# Patient Record
Sex: FEMALE | Race: WHITE | NOT HISPANIC OR LATINO | Employment: OTHER | URBAN - METROPOLITAN AREA
[De-identification: names, ages, dates, MRNs, and addresses within clinical notes are randomized per-mention and may not be internally consistent; named-entity substitution may affect disease eponyms.]

---

## 2019-10-21 PROBLEM — 398050005 DIVERTICULAR DISEASE OF COLON: Status: ACTIVE | Noted: 2019-10-21

## 2021-03-01 ENCOUNTER — OFFICE VISIT (OUTPATIENT)
Dept: URBAN - METROPOLITAN AREA CLINIC 33 | Facility: CLINIC | Age: 66
End: 2021-03-01

## 2021-03-01 VITALS
BODY MASS INDEX: 30.32 KG/M2 | TEMPERATURE: 96.3 F | SYSTOLIC BLOOD PRESSURE: 130 MMHG | DIASTOLIC BLOOD PRESSURE: 88 MMHG | HEART RATE: 96 BPM | HEIGHT: 65 IN | OXYGEN SATURATION: 90 % | WEIGHT: 182 LBS

## 2021-03-01 PROBLEM — 266433003 GASTRO-ESOPHAGEAL REFLUX DISEASE WITH ESOPHAGITIS: Status: ACTIVE | Noted: 2019-10-21

## 2021-03-01 RX ORDER — METRONIDAZOLE 500 MG/1
TAKE 1 TABLET BY MOUTH TWICE A DAY FOR 7 DAYS TABLET ORAL
Qty: 14 UNSPECIFIED | Refills: 0 | Status: ON HOLD | COMMUNITY

## 2021-03-01 RX ORDER — OMEPRAZOLE 20 MG/1
TAKE 1 CAPSULE BY MOUTH EVERY DAY CAPSULE, DELAYED RELEASE ORAL
OUTPATIENT

## 2021-03-01 RX ORDER — DILTIAZEM HYDROCHLORIDE 120 MG/1
TAKE 1 CAPSULE (120 MG TOTAL) BY MOUTH DAILY CAPSULE, EXTENDED RELEASE ORAL
Qty: 30 UNSPECIFIED | Refills: 5 | Status: DISCONTINUED | COMMUNITY

## 2021-03-01 RX ORDER — PRAVASTATIN SODIUM 40 MG/1
TAKE 1 TABLET BY MOUTH EVERY DAY AT NIGHT TABLET ORAL
Qty: 30 UNSPECIFIED | Refills: 1 | Status: ACTIVE | COMMUNITY

## 2021-03-01 RX ORDER — PREDNISONE 10 MG/1
TAKE 1 TABLET BY MOUTH EVERY DAY TABLET ORAL
Qty: 90 UNSPECIFIED | Refills: 0 | Status: ACTIVE | COMMUNITY

## 2021-03-01 RX ORDER — LEVOFLOXACIN 750 MG/1
TAKE 1 TABLET BY MOUTH EVERY DAY FOR 7 DAYS TABLET, FILM COATED ORAL
Qty: 7 UNSPECIFIED | Refills: 0 | Status: ON HOLD | COMMUNITY

## 2021-03-01 RX ORDER — FLUTICASONE FUROATE, UMECLIDINIUM BROMIDE AND VILANTEROL TRIFENATATE 100; 62.5; 25 UG/1; UG/1; UG/1
INHALE 1 PUFF EVERY DAY POWDER RESPIRATORY (INHALATION)
Qty: 60 UNSPECIFIED | Refills: 11 | Status: ACTIVE | COMMUNITY

## 2021-03-01 RX ORDER — INSULIN GLARGINE 100 [IU]/ML
INJECTED 20 UNITS EVERY MORNING INJECTION, SOLUTION SUBCUTANEOUS
Qty: 6 UNSPECIFIED | Refills: 5 | Status: DISCONTINUED | COMMUNITY

## 2021-03-01 RX ORDER — SITAGLIPTIN 50 MG/1
TAKE 1 TABLET BY MOUTH EVERY DAY TABLET, FILM COATED ORAL
Qty: 30 UNSPECIFIED | Refills: 3 | Status: DISCONTINUED | COMMUNITY

## 2021-03-01 RX ORDER — SERTRALINE HYDROCHLORIDE 50 MG/1
1 TABLET TABLET, FILM COATED ORAL ONCE A DAY
Qty: 30 | Status: ACTIVE | COMMUNITY

## 2021-03-01 RX ORDER — MONTELUKAST SODIUM 10 MG/1
TAKE 1 TABLET DAILY TABLET ORAL
Qty: 30 UNSPECIFIED | Refills: 11 | Status: ACTIVE | COMMUNITY

## 2021-03-01 RX ORDER — METOPROLOL TARTRATE 25 MG/1
TAKE 1 TABLET BY MOUTH TWICE A DAY TABLET ORAL
Qty: 60 UNSPECIFIED | Refills: 5 | Status: ACTIVE | COMMUNITY

## 2021-03-01 RX ORDER — OMEPRAZOLE 20 MG/1
TAKE 1 CAPSULE BY MOUTH EVERY DAY CAPSULE, DELAYED RELEASE ORAL
Status: ON HOLD | COMMUNITY

## 2021-03-01 NOTE — HPI-MIGRATED HPI
;   ;   ;   ;     Diverticulitis : 65-Year-old female patient presents today to follow up from an abnormal PET scan (02/15/2021) ordered by Dr. Tommy Restrepo with findings of diverticuilits.  Results are noted below.  Stable post therapy changes in the right perihilar region without evidence of recurrent or metastatic disease to the neck, chest, abdomen or pelvis.  Interim development of inflammatory changes and hyper metabolic activity associated with multiple diverticula in the sigmoid colon. Sigmoid diverticulitis is suspected.  2 to 3 mm non obstructing calculus in the right kidney. Centrilobular pulmonary emphysema.  On (2/15/2021), Dr. Restrepo prescribed patient Flagyl 500 MG BID Levaquin 750 MG 1 QD X 7 days, which she completed on (2/25/2021). She denies further episodes of abdominal pain over the past week.  Patient reports experiencing Lower abdominal pain with onset Dec. 2020, at which time she was treated with Macrobid BID x 7days for UTI. She report some improvement of pain during treatment of UTI.    Described as a dull ache that would randomly become sharp in nature.  She admits relief of symptoms.  Sympoms would occur in an intermittent pattern throughout the day, lasting for second to minutes at a time.   She report consuming Cheerio's daily prior to onset of symptoms.  Most recent labs were performed 02/19/2021 with abnormal findings of: Neutrophil: 75.7 (High), Lymphocyte: 17.0 (Low), otherwise normal.    Last visit (10/21/2019) Patient presents today with her Son and daughter after her Colonoscopy and EGD and follow up abnormal PET Scan. She denies complications following her procedure.   Admit normal and formed bowel movements per day. Denies melena, blood or mucus in stools.  Last visit (10/03/2019) Patient presents today at the request of Dr. 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  Consider colonoscopy to further evaluate, if not since prior F-18 FDG PET/CT scan.  Dr. Restrepo plan from (7/31/2019) note patient refused a colonoscopy at that time ;   Early Satiety : Admits improvement of early satiety since her last office visit.   Last visit (10/21/2019) Continue to admit early satiety.  Admit some improvement with consuming small  meals.    Last visit (10/03/2019) Admit early satiety with onset a few months ago.  Described as a feeling full quick when eating.  Admit improvement with consuming smaller portion meals throughout the day.;   Change in bowel habits : Admit change in bowel habits with onset a month ago.  Described as multiple loose and watery bowel movements daily with out melena, blood or mucus in stools.  She attributes symptoms to the use of antibiotics (Dec. 2020, Macrobid BID x 7 days for UTI, also treated by Pulmonologist with a Zpack 1 QOD x 2 months just before Dec. 2020).  She admit improve in bowel habits over the past 4-5 days.  Currently admit 2 semi-formed bowel movements oer day [id number] strain.;   Heartburn : Denies any episodes of heartburn since her last office visit.  She discontinued the use of Omeprazole 20 mg  about 2 weeks ago due to one of her antibiotics said to avoid antacid when taking it.    Last visit (10/21/2019) Admit control of symptoms with the use of Omeprazole 20 MG 1 QD.   Last visit (10/03/2019) She admit approximately an 8 year history of heartburn.  She has been taking Omeprazole 40 mg 1 QD over the past 8+ years with  complete control of symptoms.   Symptoms are described as a burning sensation retrosternally and belching.   Denies having an EGD or Barium Swallow before.  Denies a family history of esophageal/gastric cancer or disease.;

## 2021-03-04 ENCOUNTER — LAB OUTSIDE AN ENCOUNTER (OUTPATIENT)
Dept: URBAN - METROPOLITAN AREA CLINIC 35 | Facility: CLINIC | Age: 66
End: 2021-03-04

## 2021-03-09 LAB
GASTROINTESTINAL PATHOGEN: (no result)
PANCREATICELASTASE ELISA, STOOL: (no result)

## 2021-03-24 ENCOUNTER — TELEPHONE ENCOUNTER (OUTPATIENT)
Dept: URBAN - METROPOLITAN AREA CLINIC 35 | Facility: CLINIC | Age: 66
End: 2021-03-24

## 2021-03-30 LAB — CLOSTRIDIUM DIFFICILE: (no result)

## 2021-03-31 ENCOUNTER — TELEPHONE ENCOUNTER (OUTPATIENT)
Dept: URBAN - METROPOLITAN AREA CLINIC 35 | Facility: CLINIC | Age: 66
End: 2021-03-31

## 2021-04-01 ENCOUNTER — TELEPHONE ENCOUNTER (OUTPATIENT)
Dept: URBAN - METROPOLITAN AREA CLINIC 35 | Facility: CLINIC | Age: 66
End: 2021-04-01

## 2021-04-05 LAB
CULTURE, STOOL, SAL/SHIG/CAMPY AND SHIGA TOXINS EIA W/RFL E.COLI O157 CULT: (no result)

## 2021-06-16 ENCOUNTER — OFFICE VISIT (OUTPATIENT)
Dept: URBAN - METROPOLITAN AREA CLINIC 35 | Facility: CLINIC | Age: 66
End: 2021-06-16

## 2021-06-16 ENCOUNTER — TELEPHONE ENCOUNTER (OUTPATIENT)
Dept: URBAN - METROPOLITAN AREA CLINIC 35 | Facility: CLINIC | Age: 66
End: 2021-06-16

## 2021-06-16 VITALS — WEIGHT: 183 LBS | BODY MASS INDEX: 30.49 KG/M2 | HEIGHT: 65 IN

## 2021-06-16 RX ORDER — LEVOFLOXACIN 750 MG
1 TABLET TABLET ORAL ONCE A DAY
Qty: 10 | Refills: 0 | OUTPATIENT
Start: 2021-06-16

## 2021-06-16 RX ORDER — SERTRALINE HYDROCHLORIDE 50 MG/1
1 TABLET TABLET, FILM COATED ORAL ONCE A DAY
Qty: 30 | Status: ON HOLD | COMMUNITY

## 2021-06-16 RX ORDER — PRAVASTATIN SODIUM 40 MG/1
TAKE 1 TABLET BY MOUTH EVERY DAY AT NIGHT TABLET ORAL
Qty: 30 UNSPECIFIED | Refills: 1 | Status: ACTIVE | COMMUNITY

## 2021-06-16 RX ORDER — FLUTICASONE FUROATE, UMECLIDINIUM BROMIDE AND VILANTEROL TRIFENATATE 100; 62.5; 25 UG/1; UG/1; UG/1
INHALE 1 PUFF EVERY DAY POWDER RESPIRATORY (INHALATION)
Qty: 60 UNSPECIFIED | Refills: 11 | Status: ON HOLD | COMMUNITY

## 2021-06-16 RX ORDER — PREDNISONE 10 MG/1
TAKE 1 TABLET BY MOUTH EVERY DAY TABLET ORAL
Qty: 90 UNSPECIFIED | Refills: 0 | Status: ACTIVE | COMMUNITY

## 2021-06-16 RX ORDER — METOPROLOL TARTRATE 25 MG/1
TAKE 1 TABLET BY MOUTH TWICE A DAY TABLET ORAL
Qty: 60 UNSPECIFIED | Refills: 5 | Status: ACTIVE | COMMUNITY

## 2021-06-16 RX ORDER — METRONIDAZOLE 500 MG/1
1 TABLET TABLET, FILM COATED ORAL THREE TIMES A DAY
Qty: 30 | Refills: 0 | OUTPATIENT
Start: 2021-06-16

## 2021-06-16 RX ORDER — LEVOFLOXACIN 750 MG/1
TAKE 1 TABLET BY MOUTH EVERY DAY FOR 7 DAYS TABLET, FILM COATED ORAL
Qty: 7 UNSPECIFIED | Refills: 0 | Status: ON HOLD | COMMUNITY

## 2021-06-16 RX ORDER — METRONIDAZOLE 500 MG/1
TAKE 1 TABLET BY MOUTH TWICE A DAY FOR 7 DAYS TABLET ORAL
Qty: 14 UNSPECIFIED | Refills: 0 | Status: ON HOLD | COMMUNITY

## 2021-06-16 RX ORDER — MONTELUKAST SODIUM 10 MG/1
TAKE 1 TABLET DAILY TABLET ORAL
Qty: 30 UNSPECIFIED | Refills: 11 | Status: ACTIVE | COMMUNITY

## 2021-06-16 RX ORDER — OMEPRAZOLE 20 MG/1
TAKE 1 CAPSULE BY MOUTH EVERY DAY CAPSULE, DELAYED RELEASE ORAL
Status: ACTIVE | COMMUNITY

## 2021-06-16 NOTE — HPI-MIGRATED HPI
;   ;   ;     Diverticulitis : Patient presents today for follow up of diverticulitis.  She states last night she started experiencing lower abdominal pain all the way across.  She describes the pain as sharp and states it also feels like cramping.  She states she also feels a little bloated.  She denies any constipation.  States she is having regular bowel movements.  Patient admits to abd pain intermittently, and denies any aggravating factors or palliative factors.   Patient states she was diagnosed with diverticulitis back in February as well as December of last year.     Last visit (03/01/2021) 65-Year-old female patient presents today to follow up from an abnormal PET scan (02/15/2021) ordered by Dr. Tommy Restrepo with findings of diverticuilits.  Results are noted below.  Stable post therapy changes in the right perihilar region without evidence of recurrent or metastatic disease to the neck, chest, abdomen or pelvis.  Interim development of inflammatory changes and hyper metabolic activity associated with multiple diverticula in the sigmoid colon. Sigmoid diverticulitis is suspected.  2 to 3 mm non obstructing calculus in the right kidney. Centrilobular pulmonary emphysema.  On (2/15/2021), Dr. Restrepo prescribed patient Flagyl 500 MG BID Levaquin 750 MG 1 QD X 7 days, which she completed on (2/25/2021). She denies further episodes of abdominal pain over the past week.  Patient reports experiencing Lower abdominal pain with onset Dec. 2020, at which time she was treated with Macrobid BID x 7days for UTI. She report some improvement of pain during treatment of UTI.    Described as a dull ache that would randomly become sharp in nature.  She admits relief of symptoms.  Sympoms would occur in an intermittent pattern throughout the day, lasting for second to minutes at a time.   She report consuming Cheerio's daily prior to onset of symptoms.  Most recent labs were performed 02/19/2021 with abnormal findings of: Neutrophil: 75.7 (High), Lymphocyte: 17.0 (Low), otherwise normal.    Last visit (10/21/2019) Patient presents today with her Son and daughter after her Colonoscopy and EGD and follow up abnormal PET Scan. She denies complications following her procedure.   Admit normal and formed bowel movements per day. Denies melena, blood or mucus in stools.  Last visit (10/03/2019) Patient presents today at the request of Dr. 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  Consider colonoscopy to further evaluate, if not since prior F-18 FDG PET/CT scan.  Dr. Restrepo plan from (7/31/2019) note patient refused a colonoscopy at that time;   Early Satiety : Patient admits to experiencing some early satiety.  Last visit (03/01/2021) Admits improvement of early satiety since her last office visit.   Last visit (10/21/2019) Continue to admit early satiety.  Admit some improvement with consuming small  meals.    Last visit (10/03/2019) Admit early satiety with onset a few months ago.  Described as a feeling full quick when eating.  Admit improvement with consuming smaller portion meals throughout the day.;   Heartburn : She denies any episodes of heartburn since her last visit.  Last visit (03/01/2021) Denies any episodes of heartburn since her last office visit.  She discontinued the use of Omeprazole 20 mg  about 2 weeks ago due to one of her antibiotics said to avoid antacid when taking it.    Last visit (10/21/2019) Admit control of symptoms with the use of Omeprazole 20 MG 1 QD.   Last visit (10/03/2019) She admit approximately an 8 year history of heartburn.  She has been taking Omeprazole 40 mg 1 QD over the past 8+ years with  complete control of symptoms.   Symptoms are described as a burning sensation retrosternally and belching.   Denies having an EGD or Barium Swallow before.  Denies a family history of esophageal/gastric cancer or disease.;

## 2021-06-29 ENCOUNTER — OFFICE VISIT (OUTPATIENT)
Dept: URBAN - METROPOLITAN AREA CLINIC 35 | Facility: CLINIC | Age: 66
End: 2021-06-29

## 2021-06-29 VITALS — HEIGHT: 65 IN

## 2021-06-29 RX ORDER — PRAVASTATIN SODIUM 40 MG/1
TAKE 1 TABLET BY MOUTH EVERY DAY AT NIGHT TABLET ORAL
Qty: 30 UNSPECIFIED | Refills: 1 | Status: ACTIVE | COMMUNITY

## 2021-06-29 RX ORDER — METRONIDAZOLE 500 MG/1
TAKE 1 TABLET BY MOUTH TWICE A DAY FOR 7 DAYS TABLET ORAL
Qty: 14 UNSPECIFIED | Refills: 0 | Status: ON HOLD | COMMUNITY

## 2021-06-29 RX ORDER — SERTRALINE HYDROCHLORIDE 50 MG/1
1 TABLET TABLET, FILM COATED ORAL ONCE A DAY
Qty: 30 | Status: ON HOLD | COMMUNITY

## 2021-06-29 RX ORDER — LEVOFLOXACIN 750 MG/1
TAKE 1 TABLET BY MOUTH EVERY DAY FOR 7 DAYS TABLET, FILM COATED ORAL
Qty: 7 UNSPECIFIED | Refills: 0 | Status: ON HOLD | COMMUNITY

## 2021-06-29 RX ORDER — PREDNISONE 10 MG/1
TAKE 1 TABLET BY MOUTH EVERY DAY TABLET ORAL
Qty: 90 UNSPECIFIED | Refills: 0 | Status: ACTIVE | COMMUNITY

## 2021-06-29 RX ORDER — OMEPRAZOLE 20 MG/1
TAKE 1 CAPSULE BY MOUTH EVERY DAY CAPSULE, DELAYED RELEASE ORAL
Status: ACTIVE | COMMUNITY

## 2021-06-29 RX ORDER — LEVOFLOXACIN 750 MG
1 TABLET TABLET ORAL ONCE A DAY
Qty: 10 | Refills: 0 | Status: ACTIVE | COMMUNITY
Start: 2021-06-16

## 2021-06-29 RX ORDER — METOPROLOL TARTRATE 25 MG/1
TAKE 1 TABLET BY MOUTH TWICE A DAY TABLET ORAL
Qty: 60 UNSPECIFIED | Refills: 5 | Status: ACTIVE | COMMUNITY

## 2021-06-29 RX ORDER — MONTELUKAST SODIUM 10 MG/1
TAKE 1 TABLET DAILY TABLET ORAL
Qty: 30 UNSPECIFIED | Refills: 11 | Status: ACTIVE | COMMUNITY

## 2021-06-29 RX ORDER — METRONIDAZOLE 500 MG/1
1 TABLET TABLET, FILM COATED ORAL THREE TIMES A DAY
Qty: 30 | Refills: 0 | Status: ACTIVE | COMMUNITY
Start: 2021-06-16

## 2021-06-29 RX ORDER — FLUTICASONE FUROATE, UMECLIDINIUM BROMIDE AND VILANTEROL TRIFENATATE 100; 62.5; 25 UG/1; UG/1; UG/1
INHALE 1 PUFF EVERY DAY POWDER RESPIRATORY (INHALATION)
Qty: 60 UNSPECIFIED | Refills: 11 | Status: ON HOLD | COMMUNITY

## 2021-06-29 NOTE — HPI-MIGRATED HPI
;   ;   ;     Diverticulitis : Patient presents today for a follow up on diverticulitis.  She has some residual lower abd soreness, but overall feels much better.  She has semi-solid stools now, no fever/chills.  She finished her antibiotics 4 days ago.  Last visit (06/16/2021) Patient presents today for follow up of diverticulitis.  She states last night she started experiencing lower abdominal pain all the way across.  She describes the pain as sharp and states it also feels like cramping.  She states she also feels a little bloated.  She denies any constipation.  States she is having regular bowel movements.  Patient admits to abd pain intermittently, and denies any aggravating factors or palliative factors.   Patient states she was diagnosed with diverticulitis back in February as well as December of last year.     Last visit (03/01/2021) 65-Year-old female patient presents today to follow up from an abnormal PET scan (02/15/2021) ordered by Dr. Tommy Restrepo with findings of diverticuilits.  Results are noted below.  Stable post therapy changes in the right perihilar region without evidence of recurrent or metastatic disease to the neck, chest, abdomen or pelvis.  Interim development of inflammatory changes and hyper metabolic activity associated with multiple diverticula in the sigmoid colon. Sigmoid diverticulitis is suspected.  2 to 3 mm non obstructing calculus in the right kidney. Centrilobular pulmonary emphysema.  On (2/15/2021), Dr. Restrepo prescribed patient Flagyl 500 MG BID Levaquin 750 MG 1 QD X 7 days, which she completed on (2/25/2021). She denies further episodes of abdominal pain over the past week.  Patient reports experiencing Lower abdominal pain with onset Dec. 2020, at which time she was treated with Macrobid BID x 7days for UTI. She report some improvement of pain during treatment of UTI.    Described as a dull ache that would randomly become sharp in nature.  She admits relief of symptoms.  Sympoms would occur in an intermittent pattern throughout the day, lasting for second to minutes at a time.   She report consuming Cheerio's daily prior to onset of symptoms.  Most recent labs were performed 02/19/2021 with abnormal findings of: Neutrophil: 75.7 (High), Lymphocyte: 17.0 (Low), otherwise normal.    Last visit (10/21/2019) Patient presents today with her Son and daughter after her Colonoscopy and EGD and follow up abnormal PET Scan. She denies complications following her procedure.   Admit normal and formed bowel movements per day. Denies melena, blood or mucus in stools.  Last visit (10/03/2019) Patient presents today at the request of Dr. 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  Consider colonoscopy to further evaluate, if not since prior F-18 FDG PET/CT scan.  Dr. Restrepo plan from (7/31/2019) note patient refused a colonoscopy at that time;   Early Satiety : Patient admits to experiencing some early satiety. She feels less bloated and her symptom feels better, also taking her probiotics are helping.  Last visit (03/01/2021) Admits improvement of early satiety since her last office visit.   Last visit (10/21/2019) Continue to admit early satiety.  Admit some improvement with consuming small  meals.    Last visit (10/03/2019) Admit early satiety with onset a few months ago.  Described as a feeling full quick when eating.  Admit improvement with consuming smaller portion meals throughout the day.;   Heartburn : She denies any episodes of heartburn since her last visit.  Last visit (03/01/2021) Denies any episodes of heartburn since her last office visit.  She discontinued the use of Omeprazole 20 mg  about 2 weeks ago due to one of her antibiotics said to avoid antacid when taking it.    Last visit (10/21/2019) Admit control of symptoms with the use of Omeprazole 20 MG 1 QD.   Last visit (10/03/2019) She admit approximately an 8 year history of heartburn.  She has been taking Omeprazole 40 mg 1 QD over the past 8+ years with  complete control of symptoms.   Symptoms are described as a burning sensation retrosternally and belching.   Denies having an EGD or Barium Swallow before.  Denies a family history of esophageal/gastric cancer or disease.;

## 2021-07-27 ENCOUNTER — TELEPHONE ENCOUNTER (OUTPATIENT)
Dept: URBAN - METROPOLITAN AREA CLINIC 35 | Facility: CLINIC | Age: 66
End: 2021-07-27

## 2021-07-27 RX ORDER — AMOXICILLIN AND CLAVULANATE POTASSIUM 875; 125 MG/1; MG/1
1 TABLET TABLET, FILM COATED ORAL
Qty: 20 | Refills: 0 | OUTPATIENT
Start: 2021-07-27

## 2021-07-27 RX ORDER — HYOSCYAMINE SULFATE 0.12 MG/1
1 TABLET AS NEEDED ABDOMINAL PAIN/CRAMPING TABLET ORAL
Qty: 28 TABLET | Refills: 0 | OUTPATIENT
Start: 2021-07-27

## 2021-07-29 ENCOUNTER — DASHBOARD ENCOUNTERS (OUTPATIENT)
Age: 66
End: 2021-07-29

## 2021-07-29 ENCOUNTER — OFFICE VISIT (OUTPATIENT)
Dept: URBAN - METROPOLITAN AREA CLINIC 33 | Facility: CLINIC | Age: 66
End: 2021-07-29

## 2021-07-29 VITALS
BODY MASS INDEX: 30.32 KG/M2 | HEART RATE: 127 BPM | DIASTOLIC BLOOD PRESSURE: 82 MMHG | SYSTOLIC BLOOD PRESSURE: 138 MMHG | WEIGHT: 182 LBS | OXYGEN SATURATION: 94 % | HEIGHT: 65 IN

## 2021-07-29 RX ORDER — LEVOFLOXACIN 750 MG
1 TABLET TABLET ORAL ONCE A DAY
Qty: 10 | Refills: 0 | Status: DISCONTINUED | COMMUNITY
Start: 2021-06-16

## 2021-07-29 RX ORDER — HYOSCYAMINE SULFATE 0.12 MG/1
1 TABLET AS NEEDED ABDOMINAL PAIN/CRAMPING TABLET ORAL
Qty: 28 TABLET | Refills: 0 | Status: ACTIVE | COMMUNITY
Start: 2021-07-27

## 2021-07-29 RX ORDER — LEVOFLOXACIN 750 MG/1
TAKE 1 TABLET BY MOUTH EVERY DAY FOR 7 DAYS TABLET, FILM COATED ORAL
Qty: 7 UNSPECIFIED | Refills: 0 | Status: ON HOLD | COMMUNITY

## 2021-07-29 RX ORDER — OMEPRAZOLE 20 MG/1
TAKE 1 CAPSULE BY MOUTH EVERY DAY CAPSULE, DELAYED RELEASE ORAL
Status: ACTIVE | COMMUNITY

## 2021-07-29 RX ORDER — METRONIDAZOLE 500 MG/1
1 TABLET TABLET, FILM COATED ORAL THREE TIMES A DAY
Qty: 30 | Refills: 0 | Status: DISCONTINUED | COMMUNITY
Start: 2021-06-16

## 2021-07-29 RX ORDER — SERTRALINE HYDROCHLORIDE 50 MG/1
1 TABLET TABLET, FILM COATED ORAL ONCE A DAY
Qty: 30 | Status: ON HOLD | COMMUNITY

## 2021-07-29 RX ORDER — PRAVASTATIN SODIUM 40 MG/1
TAKE 1 TABLET BY MOUTH EVERY DAY AT NIGHT TABLET ORAL
Qty: 30 UNSPECIFIED | Refills: 1 | Status: ACTIVE | COMMUNITY

## 2021-07-29 RX ORDER — PREDNISONE 10 MG/1
TAKE 1 TABLET BY MOUTH EVERY DAY TABLET ORAL
Qty: 90 UNSPECIFIED | Refills: 0 | Status: ACTIVE | COMMUNITY

## 2021-07-29 RX ORDER — AMOXICILLIN AND CLAVULANATE POTASSIUM 875; 125 MG/1; MG/1
1 TABLET TABLET, FILM COATED ORAL
Qty: 20 | Refills: 0 | Status: ACTIVE | COMMUNITY
Start: 2021-07-27

## 2021-07-29 RX ORDER — FLUTICASONE FUROATE, UMECLIDINIUM BROMIDE AND VILANTEROL TRIFENATATE 100; 62.5; 25 UG/1; UG/1; UG/1
INHALE 1 PUFF EVERY DAY POWDER RESPIRATORY (INHALATION)
Qty: 60 UNSPECIFIED | Refills: 11 | Status: ON HOLD | COMMUNITY

## 2021-07-29 RX ORDER — METOPROLOL TARTRATE 25 MG/1
TAKE 1 TABLET BY MOUTH TWICE A DAY TABLET ORAL
Qty: 60 UNSPECIFIED | Refills: 5 | Status: ACTIVE | COMMUNITY

## 2021-07-29 RX ORDER — AMOXICILLIN AND CLAVULANATE POTASSIUM 875; 125 MG/1; MG/1
1 TABLET TABLET, FILM COATED ORAL
OUTPATIENT
Start: 2021-07-27

## 2021-07-29 RX ORDER — MONTELUKAST SODIUM 10 MG/1
TAKE 1 TABLET DAILY TABLET ORAL
Qty: 30 UNSPECIFIED | Refills: 11 | Status: ACTIVE | COMMUNITY

## 2021-07-29 RX ORDER — DOCUSATE SODIUM 100 MG/1
1 CAPSULE AS NEEDED CAPSULE ORAL TWICE A DAY
Status: ACTIVE | COMMUNITY

## 2021-07-29 RX ORDER — METRONIDAZOLE 500 MG/1
TAKE 1 TABLET BY MOUTH TWICE A DAY FOR 7 DAYS TABLET ORAL
Qty: 14 UNSPECIFIED | Refills: 0 | Status: ON HOLD | COMMUNITY

## 2021-07-29 NOTE — EXAM-MIGRATED EXAMINATIONS
ABDOMEN: - bowel sounds present, no masses palpable, no organomegaly , no rebound tenderness, soft, tender- lower abdomen, nondistended;

## 2021-07-29 NOTE — HPI-MIGRATED HPI
;   ;   ;   ;   ;     Diverticulitis : Patient states that she has avoided nuts, seeds, and popcorn.  Currently reports 1 strenuous incomplete bowel movement per day. Denies melena in stools.   Last visit (6/29/2021)  Patient presents today for a follow up on diverticulitis.  She has some residual lower abd soreness, but overall feels much better.  She has semi-solid stools now, no fever/chills.  She finished her antibiotics 4 days ago.  Last visit (06/16/2021) Patient presents today for follow up of diverticulitis.  She states last night she started experiencing lower abdominal pain all the way across.  She describes the pain as sharp and states it also feels like cramping.  She states she also feels a little bloated.  She denies any constipation.  States she is having regular bowel movements.  Patient admits to abd pain intermittently, and denies any aggravating factors or palliative factors.   Patient states she was diagnosed with diverticulitis back in February as well as December of last year.     Last visit (03/01/2021) 65-Year-old female patient presents today to follow up from an abnormal PET scan (02/15/2021) ordered by Dr. Tommy Restrepo with findings of diverticuilits.  Results are noted below.  Stable post therapy changes in the right perihilar region without evidence of recurrent or metastatic disease to the neck, chest, abdomen or pelvis.  Interim development of inflammatory changes and hyper metabolic activity associated with multiple diverticula in the sigmoid colon. Sigmoid diverticulitis is suspected.  2 to 3 mm non obstructing calculus in the right kidney. Centrilobular pulmonary emphysema.  On (2/15/2021), Dr. Restrepo prescribed patient Flagyl 500 MG BID Levaquin 750 MG 1 QD X 7 days, which she completed on (2/25/2021). She denies further episodes of abdominal pain over the past week.  Patient reports experiencing Lower abdominal pain with onset Dec. 2020, at which time she was treated with Macrobid BID x 7days for UTI. She report some improvement of pain during treatment of UTI.    Described as a dull ache that would randomly become sharp in nature.  She admits relief of symptoms.  Sympoms would occur in an intermittent pattern throughout the day, lasting for second to minutes at a time.   She report consuming Cheerio's daily prior to onset of symptoms.  Most recent labs were performed 02/19/2021 with abnormal findings of: Neutrophil: 75.7 (High), Lymphocyte: 17.0 (Low), otherwise normal.    Last visit (10/21/2019) Patient presents today with her Son and daughter after her Colonoscopy and EGD and follow up abnormal PET Scan. She denies complications following her procedure.   Admit normal and formed bowel movements per day. Denies melena, blood or mucus in stools.  Last visit (10/03/2019) Patient presents today at the request of Dr. 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  Consider colonoscopy to further evaluate, if not since prior F-18 FDG PET/CT scan.  Dr. Restrepo plan from (7/31/2019) note patient refused a colonoscopy at that time;   Early Satiety : Admits early satiety x 4 days.     Last visit (6/29/2021)  Patient admits to experiencing some early satiety. She feels less bloated and her symptom feels better, also taking her probiotics are helping.  Last visit (03/01/2021) Admits improvement of early satiety since her last office visit.   Last visit (10/21/2019) Continue to admit early satiety.  Admit some improvement with consuming small  meals.    Last visit (10/03/2019) Admit early satiety with onset a few months ago.  Described as a feeling full quick when eating.  Admit improvement with consuming smaller portion meals throughout the day.;   Change in bowel habits : Admit change in bowel habits with onset (7/27/2021).  She has the urge to have an evacuation, then will have incomplete strenuous BM with "foamy stools" to no bowel movement. She denies rectal pain/bleeding, pruritus ani.   Still having strenuous incomplete bowel movements per day. Denies melena in stools.  She noticed red strand in stool this morning and attribute to consuming fresh tomatoes, which has happened before.  She began Colace 100mg 1 BID yesterday without change.  Prior bowel habits consisted of 1 normal BM per day with occasional straining. ;   Heartburn : She continue the use of Omeprazole 20 mg 1 QD.  Denies any break through episodes of heartburn since her last visit.   Last visit (6/29/2021)  She denies any episodes of heartburn since her last visit.  Last visit (03/01/2021) Denies any episodes of heartburn since her last office visit.  She discontinued the use of Omeprazole 20 mg  about 2 weeks ago due to one of her antibiotics said to avoid antacid when taking it.    Last visit (10/21/2019) Admit control of symptoms with the use of Omeprazole 20 MG 1 QD.   Last visit (10/03/2019) She admit approximately an 8 year history of heartburn.  She has been taking Omeprazole 40 mg 1 QD over the past 8+ years with  complete control of symptoms.   Symptoms are described as a burning sensation retrosternally and belching.   Denies having an EGD or Barium Swallow before.  Denies a family history of esophageal/gastric cancer or disease.;   Abdominal Pain : Patient contact the office on (7/27/2021)  c/o lower abdominal pain described as a dull/cramping, also has a fever of 100 degrees.  Pain is mostly present in early am (4 am) or during the night.  The pain is also present during the day, but not as bad or as frequent.  Pain level is a 3/10.    Pain worse upon waking this morning 7/10 pain level gradually improving with a pain now at 3/10.  She admit relief with flatus, belching and Tylenol prn.  Subsequently she was treated with Hyoscyamine 1 QID prn x 7 days, Colace 100 mg 1 BID and Augmentin 875 mg 1 BID x 10 days.   Hyoscyamine 1 BID and Probiotic helped some. Schedule to repeat CT PET on (8/13/2021) ;

## 2021-07-30 ENCOUNTER — TELEPHONE ENCOUNTER (OUTPATIENT)
Dept: URBAN - METROPOLITAN AREA CLINIC 35 | Facility: CLINIC | Age: 66
End: 2021-07-30

## 2021-08-04 ENCOUNTER — TELEPHONE ENCOUNTER (OUTPATIENT)
Dept: URBAN - METROPOLITAN AREA CLINIC 35 | Facility: CLINIC | Age: 66
End: 2021-08-04

## 2021-08-05 ENCOUNTER — OFFICE VISIT (OUTPATIENT)
Dept: URBAN - METROPOLITAN AREA CLINIC 33 | Facility: CLINIC | Age: 66
End: 2021-08-05

## 2021-08-06 ENCOUNTER — TELEPHONE ENCOUNTER (OUTPATIENT)
Dept: URBAN - METROPOLITAN AREA CLINIC 35 | Facility: CLINIC | Age: 66
End: 2021-08-06

## 2021-08-09 ENCOUNTER — TELEPHONE ENCOUNTER (OUTPATIENT)
Dept: URBAN - METROPOLITAN AREA CLINIC 35 | Facility: CLINIC | Age: 66
End: 2021-08-09

## 2021-09-13 ENCOUNTER — OFFICE VISIT (OUTPATIENT)
Dept: URBAN - METROPOLITAN AREA CLINIC 35 | Facility: CLINIC | Age: 66
End: 2021-09-13

## 2021-09-13 NOTE — HPI-MIGRATED HPI
;   ;   ;   ;   ;     Diverticulitis : She admits/denies any flare ups since his last visit.      Last visit (7/29/2021)  Patient states that she has avoided nuts, seeds, and popcorn.  Currently reports 1 strenuous incomplete bowel movement per day. Denies melena in stools.   Last visit (6/29/2021)  Patient presents today for a follow up on diverticulitis.  She has some residual lower abd soreness, but overall feels much better.  She has semi-solid stools now, no fever/chills.  She finished her antibiotics 4 days ago.  Last visit (06/16/2021) Patient presents today for follow up of diverticulitis.  She states last night she started experiencing lower abdominal pain all the way across.  She describes the pain as sharp and states it also feels like cramping.  She states she also feels a little bloated.  She denies any constipation.  States she is having regular bowel movements.  Patient admits to abd pain intermittently, and denies any aggravating factors or palliative factors.   Patient states she was diagnosed with diverticulitis back in February as well as December of last year.     Last visit (03/01/2021) 65-Year-old female patient presents today to follow up from an abnormal PET scan (02/15/2021) ordered by Dr. Tommy Restrepo with findings of diverticuilits.  Results are noted below.  Stable post therapy changes in the right perihilar region without evidence of recurrent or metastatic disease to the neck, chest, abdomen or pelvis.  Interim development of inflammatory changes and hyper metabolic activity associated with multiple diverticula in the sigmoid colon. Sigmoid diverticulitis is suspected.  2 to 3 mm non obstructing calculus in the right kidney. Centrilobular pulmonary emphysema.  On (2/15/2021), Dr. Restrepo prescribed patient Flagyl 500 MG BID Levaquin 750 MG 1 QD X 7 days, which she completed on (2/25/2021). She denies further episodes of abdominal pain over the past week.  Patient reports experiencing Lower abdominal pain with onset Dec. 2020, at which time she was treated with Macrobid BID x 7days for UTI. She report some improvement of pain during treatment of UTI.    Described as a dull ache that would randomly become sharp in nature.  She admits relief of symptoms.  Sympoms would occur in an intermittent pattern throughout the day, lasting for second to minutes at a time.   She report consuming Cheerio's daily prior to onset of symptoms.  Most recent labs were performed 02/19/2021 with abnormal findings of: Neutrophil: 75.7 (High), Lymphocyte: 17.0 (Low), otherwise normal.    Last visit (10/21/2019) Patient presents today with her Son and daughter after her Colonoscopy and EGD and follow up abnormal PET Scan. She denies complications following her procedure.   Admit normal and formed bowel movements per day. Denies melena, blood or mucus in stools.  Last visit (10/03/2019) Patient presents today at the request of Dr. 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  Consider colonoscopy to further evaluate, if not since prior F-18 FDG PET/CT scan.  Dr. Restrepo plan from (7/31/2019) note patient refused a colonoscopy at that time;   Early Satiety : She admits/denies improvement in episodes of early satiety.    Last visit (7/29/2021)  Admits early satiety x 4 days.     Last visit (6/29/2021)  Patient admits to experiencing some early satiety. She feels less bloated and her symptom feels better, also taking her probiotics are helping.  Last visit (03/01/2021) Admits improvement of early satiety since her last office visit.   Last visit (10/21/2019) Continue to admit early satiety.  Admit some improvement with consuming small  meals.    Last visit (10/03/2019) Admit early satiety with onset a few months ago.  Described as a feeling full quick when eating.  Admit improvement with consuming smaller portion meals throughout the day.;   Change in bowel habits : She admits/denies her bowel habits have returned to normal at this time.  Currently reports --- bowel movements -- with/out strain. Her stools are ---- with/out blood, mucus, or melena.    Last visit (7/29/2021)  Admit change in bowel habits with onset (7/27/2021).  She has the urge to have an evacuation, then will have incomplete strenuous BM with "foamy stools" to no bowel movement. She denies rectal pain/bleeding, pruritus ani.   Still having strenuous incomplete bowel movements per day. Denies melena in stools.  She noticed red strand in stool this morning and attribute to consuming fresh tomatoes, which has happened before.  She began Colace 100mg 1 BID yesterday without change.  Prior bowel habits consisted of 1 normal BM per day with occasional straining.;   Heartburn : Patient admits/denies any break through episodes of heartburn. She reports the use of Omeprazole 20 mg 1 QD does/not control her symptoms.    Last visit (7/29/2021)  She continue the use of Omeprazole 20 mg 1 QD.  Denies any break through episodes of heartburn since her last visit.   Last visit (6/29/2021)  She denies any episodes of heartburn since her last visit.  Last visit (03/01/2021) Denies any episodes of heartburn since her last office visit.  She discontinued the use of Omeprazole 20 mg  about 2 weeks ago due to one of her antibiotics said to avoid antacid when taking it.    Last visit (10/21/2019) Admit control of symptoms with the use of Omeprazole 20 MG 1 QD.   Last visit (10/03/2019) She admit approximately an 8 year history of heartburn.  She has been taking Omeprazole 40 mg 1 QD over the past 8+ years with  complete control of symptoms.   Symptoms are described as a burning sensation retrosternally and belching.   Denies having an EGD or Barium Swallow before.  Denies a family history of esophageal/gastric cancer or disease.;   Abdominal Pain : Patient presents today for a 6 month follow up. She admits/denies continued abdominal pain at this time.   A CT Abdomen and Pelvis W/ Contrast was completed on 7/30/2021 revealing acute sigmoid diverticulitis with intramural abscess 1.8 x 0.9 cm. No gross perforation. Sever COPD-emphysema with stable posttreatment changes in the right infrahilar region. No acute cardiopulmonary findings.    Last visit (7/29/2021)  Patient contact the office on (7/27/2021)  c/o lower abdominal pain described as a dull/cramping, also has a fever of 100 degrees.  Pain is mostly present in early am (4 am) or during the night.  The pain is also present during the day, but not as bad or as frequent.  Pain level is a 3/10.    Pain worse upon waking this morning 7/10 pain level gradually improving with a pain now at 3/10.  She admit relief with flatus, belching and Tylenol prn.  Subsequently she was treated with Hyoscyamine 1 QID prn x 7 days, Colace 100 mg 1 BID and Augmentin 875 mg 1 BID x 10 days.   Hyoscyamine 1 BID and Probiotic helped some. Schedule to repeat CT PET on (8/13/2021);